# Patient Record
Sex: MALE | Race: WHITE | NOT HISPANIC OR LATINO | Employment: FULL TIME | URBAN - METROPOLITAN AREA
[De-identification: names, ages, dates, MRNs, and addresses within clinical notes are randomized per-mention and may not be internally consistent; named-entity substitution may affect disease eponyms.]

---

## 2024-09-10 ENCOUNTER — CLINICAL SUPPORT (OUTPATIENT)
Dept: AUDIOLOGY | Facility: CLINIC | Age: 64
End: 2024-09-10
Payer: COMMERCIAL

## 2024-09-10 ENCOUNTER — APPOINTMENT (OUTPATIENT)
Dept: AUDIOLOGY | Facility: CLINIC | Age: 64
End: 2024-09-10

## 2024-09-10 ENCOUNTER — APPOINTMENT (OUTPATIENT)
Dept: OTOLARYNGOLOGY | Facility: CLINIC | Age: 64
End: 2024-09-10

## 2024-09-10 VITALS — BODY MASS INDEX: 21.07 KG/M2 | HEIGHT: 68 IN | TEMPERATURE: 96.8 F | WEIGHT: 139 LBS

## 2024-09-10 DIAGNOSIS — R42 DIZZINESS: Primary | ICD-10-CM

## 2024-09-10 DIAGNOSIS — H90.3 SENSORINEURAL HEARING LOSS (SNHL) OF BOTH EARS: ICD-10-CM

## 2024-09-10 DIAGNOSIS — H61.23 BILATERAL IMPACTED CERUMEN: ICD-10-CM

## 2024-09-10 DIAGNOSIS — R42 VERTIGO: Primary | ICD-10-CM

## 2024-09-10 DIAGNOSIS — H90.3 BILATERAL SENSORINEURAL HEARING LOSS: ICD-10-CM

## 2024-09-10 PROCEDURE — 92550 TYMPANOMETRY & REFLEX THRESH: CPT | Performed by: AUDIOLOGIST

## 2024-09-10 PROCEDURE — 99203 OFFICE O/P NEW LOW 30 MIN: CPT | Performed by: OTOLARYNGOLOGY

## 2024-09-10 PROCEDURE — 69210 REMOVE IMPACTED EAR WAX UNI: CPT | Performed by: OTOLARYNGOLOGY

## 2024-09-10 PROCEDURE — 92557 COMPREHENSIVE HEARING TEST: CPT | Performed by: AUDIOLOGIST

## 2024-09-10 PROCEDURE — 3008F BODY MASS INDEX DOCD: CPT | Performed by: OTOLARYNGOLOGY

## 2024-09-10 PROCEDURE — 1036F TOBACCO NON-USER: CPT | Performed by: OTOLARYNGOLOGY

## 2024-09-10 NOTE — PROGRESS NOTES
"   EVALUATION OF BENIGN POSITIONAL PAROXYSMAL VERTIGO BPPV    HISTORY:  Patient reports in Spring of 2024 he had an \"episode\" of waking up with room spinning; nauseated and felt \"off\" for a few hours.   He reports he had another episode in August; afraid to move that day.  He reports he tried the Epley \"himself\"; dizziness.  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   He denies any ringing in ears; no significant hearing loss.   He reports he has had re-occurring cerumen impaction most of his life; small ear canals bilaterally     EVALUATION: Hallpike head right and left were both negative for nystagmus; no report of dizziness.        RECOMMENDATIONS:   Added on for audiogram.   Following up with Diego Edward MD.   Consider further evaluation.      Shanna Pool M.A., CCC/A       "

## 2024-09-10 NOTE — PROGRESS NOTES
"  AUDIOLOGY ADULT AUDIOMETRIC EVALUATION    Name:  Adrián Madison  :  1960  Age:  63 y.o.  Date of Evaluation:  September 10, 2024    Reason for visit: Adrián is seen in the clinic today for an audiologic evaluation.     HISTORY  Patient reports in Spring of 2024 he had an \"episode\" of waking up with room spinning; nauseated and felt \"off\" for a few hours.   He reports he had another episode in August; afraid to move that day.  He reports he tried the Epley \"himself\"; dizziness.  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   He denies any ringing in ears; no significant hearing loss.   He reports he has had re-occurring cerumen impaction most of his life; small ear canals bilaterally.   Today he was negative for BPPV bilaterally.      EVALUATION  See scanned audiogram: “Media” > “Audiology Report”.      RESULTS  Otoscopic Evaluation: *small; narrow ear canals bilaterally  Right Ear: moderately occluded  Left Ear: mildly occluded     Immittance Measures:  Tympanometry:  Right Ear: Type A, normal tympanic membrane mobility with normal middle ear pressure  Left Ear: Type A, normal tympanic membrane mobility with normal middle ear pressure    Acoustic Reflexes:  Ipsilateral Right Ear: Acoustic reflexes present within normal limits 500Hz through 4KHz   Ipsilateral Left Ear: Acoustic reflexes present within normal limits 500Hz through 4KHz   Contralateral Right Ear: did not evaluate  Contralateral Left Ear: did not evaluate    Distortion Product Otoacoustic Emissions (DPOAEs):  Right Ear: Passed 1KHz-6KHz; refer at 8KHz   Left Ear: Passed 1500Hz-5KHz; Refer at 1KHz; 6KHz-8KHz     Audiometry:  Test Technique and Reliability:   Standard audiometry via insert phones.   Reliability is good.    Pure tone air and bone conduction audiometry:  Right Ear: Hearing sensitivity within normal limits 250Hz through 6KHz; moderate hearing loss 8KHz   Left Ear: Hearing sensitivity " within normal limits 250Hz through 4KHz; mild to moderate sensorineural hearing loss 6KHz-8KHz     Speech Audiometry (Word Recognition Scores):   Right Ear: Excellent  Left Ear: Excellent    IMPRESSIONS    Normal hearing sensitivity through 6KHz right ear; moderate hearing loss at 8KHz   Left ear demonstrated normal hearing sensitivity through 4KHz; mild to moderate hearing loss 6KHz-8KHz     The presence of acoustic reflexes within normal intensity limits is consistent with normal middle ear and brainstem function, and suggests that auditory sensitivity is not significantly impaired. An elevated or absent acoustic reflex threshold is consistent with a middle ear disorder, hearing loss in the stimulated ear, and/or interruption of neural innervation of the stapedius muscle. Present DPOAEs suggest normal/near normal cochlear outer hair cell function and are consistent with no greater than a mild hearing loss at those frequencies. Absent DPOAEs are consistent with abnormal cochlear outer hair cell function and some degree of hearing loss at those frequencies.    RECOMMENDATIONS  - Follow up with otolaryngology today as scheduled; have cerumen removed; consider further evaluation of episodic dizziness/imbalance  - Annual audiologic evaluation, sooner if an acute change is noted.    PATIENT EDUCATION  Discussed results, impressions and recommendations with the patient. Questions were addressed and the patient was encouraged to contact our office should concerns arise.    Time for this encounter: 1030/1110      Shanna Pool M.A., CCC/A   Licensed Audiologist

## 2024-09-10 NOTE — PROGRESS NOTES
"Chief Complaint   Patient presents with    New Patient Visit     F/U AUDIO/DIZZINESS ON AND OFF     HPI:  Adrián Mdaison is a 63 y.o. male for complaints dizziness.  Has been occurring for about 6 months intermittent in nature.  Had 2 significant episodes in August which lasted several hours.  Mainly feels very off balance with some sensation of movement as well as some nausea.  No significant hearing trouble, tinnitus, pressure, pain.  Audiogram performed today does show high-frequency sensorineural hearing loss    PMH:  Past Medical History:   Diagnosis Date    Encounter for general adult medical examination without abnormal findings 02/25/2015    Physical exam, routine     History reviewed. No pertinent surgical history.      Medications:   No current outpatient medications on file.     Allergies:  Allergies   Allergen Reactions    Bee Venom Protein (Honey Bee) Anaphylaxis     Yellow jackets and bees per patient        ROS:  Review of systems normal unless stated otherwise in the HPI and/or PMH.    Physical Exam:  Temperature 36 °C (96.8 °F), height 1.727 m (5' 8\"), weight 63 kg (139 lb). Body mass index is 21.13 kg/m².     GENERAL APPEARANCE: Well developed and well nourished.  Alert and oriented in no acute distress.  Normal vocal quality.      HEAD/FACE: No erythema or edema or facial tenderness.  Normal facial nerve function bilaterally.    EAR:       EXTERNAL: Normal pinnas and bilateral obstructive cerumen impaction was removed by myself with instrumentation using the operating microscope.  Normal pinnas and external auditory canals after cleaning.       MIDDLE EAR: Tympanic membranes intact and mobile with normal landmarks.  Middle ear space appears well aerated.       TUBE STATUS: N/A       MASTOID CAVITY: N/A       HEARING: Gross hearing assessment is within normal limits.      NOSE:       VISUALIZED USING: Anterior rhinoscopy with headlight and nasal speculum.       DORSUM: Midline, nontraumatic " appearance.       MUCOSA: Normal-appearing.       SECRETIONS: Normal.       SEPTUM: Midline and nonobstructing.       INFERIOR TURBINATES: Normal.       MIDDLE TURBINATES/MEATUS: N/A       BLEEDING: N/A         ORAL CAVITY/PHARYNX:       TEETH: Adequate dentition.       TONGUE: No mass or lesion.  Normal mobility.       FLOOR OF MOUTH: No mass or lesion.       PALATE: Normal hard palate, soft palate, and uvula.       OROPHARYNX: Normal without mass or lesion.       BUCCAL MUCOSA/GBS: Normal without mass or lesion.       LIPS: Normal.    LARYNX/HYPOPHARYNX/NASOPHARYNX: N/A    NECK: No palpable masses or abnormal adenopathy.  Trachea is midline.    THYROID: No thyromegaly or palpable nodule.    SALIVARY GLANDS: Normal bilateral parotid and submandibular glands by inspection and palpation.    TMJ's: Normal.    NEURO: Cranial nerve exam grossly normal bilaterally.       Assessment/Plan   Adrián was seen today for new patient visit.  Diagnoses and all orders for this visit:  Vertigo (Primary)  -     MR IAC w and wo IV contrast; Future  -     Referral to Physical Therapy; Future  -     Vestibular Testing; Future  Bilateral impacted cerumen  Bilateral sensorineural hearing loss     Having some vertigo.  Does not sound positional in nature.  Could be peripheral or central.  Recommend we do some testing with MRI as well as VNG and begin vestibular rehabilitation for treatment.  Follow up for test results after testing is complete.     Diego Edward MD

## 2024-09-25 ENCOUNTER — HOSPITAL ENCOUNTER (OUTPATIENT)
Dept: RADIOLOGY | Facility: CLINIC | Age: 64
Discharge: HOME | End: 2024-09-25
Payer: COMMERCIAL

## 2024-09-25 DIAGNOSIS — R42 VERTIGO: ICD-10-CM

## 2024-09-25 PROCEDURE — 70553 MRI BRAIN STEM W/O & W/DYE: CPT | Performed by: RADIOLOGY

## 2024-09-25 PROCEDURE — 2550000001 HC RX 255 CONTRASTS: Performed by: OTOLARYNGOLOGY

## 2024-09-25 PROCEDURE — A9575 INJ GADOTERATE MEGLUMI 0.1ML: HCPCS | Performed by: OTOLARYNGOLOGY

## 2024-09-25 PROCEDURE — 70553 MRI BRAIN STEM W/O & W/DYE: CPT

## 2024-09-25 RX ORDER — GADOTERATE MEGLUMINE 376.9 MG/ML
13 INJECTION INTRAVENOUS
Status: COMPLETED | OUTPATIENT
Start: 2024-09-25 | End: 2024-09-25

## 2024-09-26 ENCOUNTER — TELEPHONE (OUTPATIENT)
Dept: OTOLARYNGOLOGY | Facility: CLINIC | Age: 64
End: 2024-09-26
Payer: COMMERCIAL

## 2024-09-26 DIAGNOSIS — R93.0 ABNORMAL MRI OF HEAD: ICD-10-CM

## 2024-09-26 DIAGNOSIS — D33.3 LEFT ACOUSTIC NEUROMA (MULTI): Primary | ICD-10-CM

## 2024-09-26 NOTE — TELEPHONE ENCOUNTER
Pt called and left a message.  He said he saw the results of his MRI on my chart.  He is worried .  He said it says he has several tumors.

## 2024-09-27 ENCOUNTER — TELEPHONE (OUTPATIENT)
Dept: OTOLARYNGOLOGY | Facility: CLINIC | Age: 64
End: 2024-09-27
Payer: COMMERCIAL

## 2024-09-27 NOTE — TELEPHONE ENCOUNTER
Contacted patient regarding acoustic neuroma referral from Dr. Edward. Patient has been scheduled with both ENT and Neurosurgery providers for Skull base coordination. He will contact me directly for additional questions regarding appointments.

## 2024-10-07 NOTE — PROGRESS NOTES
History Of Present Illness:  Adrián Madison is a 64 y.o. male whom presents to me as a new patient for left acoustic neuroma and right sided abnormal MRI, referred here today by Diego Edward MD. The patient reports dizziness which has occurred since March 2024. He initially had the Epley maneuver performed which he reports made his symptoms worse. He mainly feels very off balance with some sensation of movement as well as some nausea. He reports 2 significant episodes of vertigo in August which caused him to miss multiple days of work.     He reports his brother also had an acoustic neuroma and had surgery for this where he lost his hearing.     The patient reports ear problems going back to when he was 7. He endorses otorrhea once a year and had regular ear irrigations.      Past Medical History:  He has a past medical history of Encounter for general adult medical examination without abnormal findings (02/25/2015).    Surgical History:  He has no past surgical history on file.     Social History:  He reports that he has never smoked. He has never used smokeless tobacco. No history on file for alcohol use and drug use.    Family History:  No family history on file.     Medications:  Current Outpatient Medications   Medication Instructions    EPINEPHrine 0.3 mg/0.3 mL injection syringe INJECT 0.3 ML INTRAMUSCULARLY AS NEEDED.    omeprazole (PriLOSEC) 20 mg DR capsule 1 capsule, Every 24 hours    predniSONE (Deltasone) 10 mg tablet 2 tablets, oral, Daily    tiZANidine (Zanaflex) 4 mg tablet 1 tablet, oral, 3 times daily PRN        Allergies:  Bee venom protein (honey bee)    Review of Systems:   A comprehensive 10-point review of systems was obtained including constitutional, neurological, HEENT, pulmonary, cardiovascular, genito-urinary, and other pertinent systems and was negative except as noted in the HPI.      Physical Exam:  Constitutional   General appearance: Healthy-appearing, well-nourished, well  "groomed, in no acute distress.   Ability to communicate: Normal communication without aids, normal voice quality.       Head and face: Atraumatic with no masses, lesions, or scarring.   Facial strength: Normal strength and symmetry, no synkinesis or facial tic.     Ears  Otoscopic examination: Bilateral normal otoscopy of the tympanic membrane     Nose: Dorsum symmetric with no visible or palpable deformities.     Oral Cavity/Mouth  Lips, teeth, and gums: Normal lips, gums, and dentition.     Oropharynx: Mucosa moist, no lesions.     Neck: Symmetrical, trachea midline. No masses visible.     Neurological/Psychiatric  Cranial Nerve Examination: II - XII grossly intact.  Orientation to person, place, and time: Normal.  Mood and affect: Normal.     Skin: Normal without rashes or lesions.     Pulmonary  Respiratory effort: Chest expands symmetrically.     Cardiovascular: Good peripheral pulses  Peripheral vascular system: No varicosities, carotid pulse normal, no edema. No jugular venous distension.     Extremities: Appearance of extremities: Normal. Gait normal.     Last Recorded Vitals:  Temperature 37.1 °C (98.8 °F), height 1.676 m (5' 6\"), weight 63.3 kg (139 lb 9.6 oz).    Relevant Results:  I personally reviewed the MRI IAC from 9/25/24 which demonstrated enhancing mass centered in the left IAC measuring 8 mm in transverse dimension by 5 mm in Ap dimension by 5 mm in craniocaudal dimension. The fundus is spared with no significant protrusion into the cerebellopontine angle cistern.     I personally reviewed the audiogram from 9/10/24 which demonstrated normal hearing with moderate hearing loss at 8000 Hz on the right and mild to moderate SNHL 6968-2466 Hz.          Assessment/Plan   64 y.o. male whom presents to me as a new patient for left acoustic neuroma and right sided abnormal MRI, referred here today by Diego Edward MD. The patient reports dizziness which has occurred since March 2024. He initially had " the Epley maneuver performed which he reports made his symptoms worse. He mainly feels very off balance with some sensation of movement as well as some nausea. He reports his brother also had an acoustic neuroma and had surgery for this where he lost his hearing. I did discuss and offer genetic testing to assess for NF2 gene but he declined. He does not have children at this point. We discussed treatment options for vestibular schwannoma including surgery vs radiation vs observation. The patient has elected to proceed with observation at this time. I will order the patient a repeat MRI IAC to be completed in 6 months. The patient will follow-up with me in 6 months.       -Referral to vestibular PT  -Order repeat MRI IAC to be repeated in 6 months  -RTC in 6 months    Scribe Attestation  By signing my name below, IFabiana Scribe   attest that this documentation has been prepared under the direction and in the presence of Leyda Ruiz MD.     I have reviewed the documentation as scribed by Jamie Tamayo and agree with the notes.   Leyda Ruiz MD

## 2024-10-07 NOTE — PATIENT INSTRUCTIONS
Welcome to Dr. Ruiz's clinic. We are here to assist you through your ENT care at [facility]. Dr. Ruiz is an Ear surgeon. This means that she specializes in taking care of patients with complex ear problems.     Dr. Ruiz's office number is [phone]. While you may see her at a satellite office, she has a team committed to help meet your healthcare needs at [facility]'s main [place]. This number is the most direct way to communicate with the office.     Keli is Dr. Ruiz's  and she answers the office phone from 8am-4pm Mon-Fri. She can help you with many general questions and information. Questions that she cannot answer will be directed to the appropriate staff. You may need to leave a message. In this case, someone from the team will call you back.     Jamie Nayak RN, is Dr. Ruiz's primary nurse and can be reached by calling the office. Jamie is in clinic with Dr. Ruiz's on Mondays and Tuesdays. Non-urgent calls will be returned on non-clinic days typically Thursdays.     Sometimes, other team members will also be involved in your care. These people may include dieticians, social workers, speech therapists, audiologist, neurologist, and physical therapist. Dr. Ruiz will provide these referrals as needed. Please let her know if you would like to request a specific referral.     For your convenience, Dr. Ruiz sees patients at several [facility] locations including [facility] and [facility] at the main campus of [facility]. While we try to make your appointments as convenient as possible, occasionally a visit to another location may be necessary to provide the best care for you. We look forward to working with you to meet your healthcare goals.     Dr. Ruiz makes every effort to run on time for your appointments. Therefore, if you are more than 30 minutes late unrelated to a scan or another appointment such therapy or audio you will have to  reschedule.

## 2024-10-08 ENCOUNTER — APPOINTMENT (OUTPATIENT)
Dept: NEUROSURGERY | Facility: CLINIC | Age: 64
End: 2024-10-08
Payer: COMMERCIAL

## 2024-10-08 ENCOUNTER — APPOINTMENT (OUTPATIENT)
Dept: OTOLARYNGOLOGY | Facility: CLINIC | Age: 64
End: 2024-10-08
Payer: COMMERCIAL

## 2024-10-08 VITALS — HEIGHT: 66 IN | WEIGHT: 139.6 LBS | BODY MASS INDEX: 22.43 KG/M2 | TEMPERATURE: 98.8 F

## 2024-10-08 DIAGNOSIS — D33.3 LEFT ACOUSTIC NEUROMA (MULTI): ICD-10-CM

## 2024-10-08 DIAGNOSIS — R42 DIZZINESS AND GIDDINESS: Primary | ICD-10-CM

## 2024-10-08 PROBLEM — K21.9 GERD (GASTROESOPHAGEAL REFLUX DISEASE): Status: ACTIVE | Noted: 2024-10-08

## 2024-10-08 PROBLEM — J06.9 ACUTE UPPER RESPIRATORY INFECTION: Status: ACTIVE | Noted: 2024-10-08

## 2024-10-08 PROBLEM — J10.1 INFLUENZA A: Status: ACTIVE | Noted: 2024-10-08

## 2024-10-08 PROBLEM — S59.919A FOREARM INJURY: Status: ACTIVE | Noted: 2024-10-08

## 2024-10-08 PROBLEM — S59.909A ELBOW INJURY: Status: ACTIVE | Noted: 2024-10-08

## 2024-10-08 PROBLEM — G56.00 CARPAL TUNNEL SYNDROME: Status: ACTIVE | Noted: 2024-10-08

## 2024-10-08 PROBLEM — R07.81 RIB PAIN ON RIGHT SIDE: Status: ACTIVE | Noted: 2024-10-08

## 2024-10-08 PROBLEM — M62.838 MUSCLE SPASM: Status: ACTIVE | Noted: 2024-10-08

## 2024-10-08 PROBLEM — R05.9 COUGH: Status: ACTIVE | Noted: 2024-10-08

## 2024-10-08 PROBLEM — T14.8XXA MUSCLE STRAIN: Status: ACTIVE | Noted: 2024-10-08

## 2024-10-08 PROBLEM — M77.11 LATERAL EPICONDYLITIS OF RIGHT ELBOW: Status: ACTIVE | Noted: 2024-10-08

## 2024-10-08 PROBLEM — S40.021A ARM CONTUSION, RIGHT, INITIAL ENCOUNTER: Status: ACTIVE | Noted: 2024-10-08

## 2024-10-08 PROBLEM — R51.9 HEADACHE: Status: ACTIVE | Noted: 2024-10-08

## 2024-10-08 PROBLEM — E55.9 VITAMIN D DEFICIENCY: Status: ACTIVE | Noted: 2024-10-08

## 2024-10-08 PROBLEM — R20.0 ANESTHESIA OF SKIN: Status: ACTIVE | Noted: 2024-10-08

## 2024-10-08 PROBLEM — R20.2 PARESTHESIA OF SKIN: Status: ACTIVE | Noted: 2024-10-08

## 2024-10-08 PROBLEM — H61.23 IMPACTED CERUMEN OF BOTH EARS: Status: ACTIVE | Noted: 2024-10-08

## 2024-10-08 PROBLEM — K42.9 UMBILICAL HERNIA: Status: ACTIVE | Noted: 2024-10-08

## 2024-10-08 PROBLEM — K40.20 BILATERAL INGUINAL HERNIA: Status: ACTIVE | Noted: 2024-10-08

## 2024-10-08 PROCEDURE — 99205 OFFICE O/P NEW HI 60 MIN: CPT | Performed by: OTOLARYNGOLOGY

## 2024-10-08 PROCEDURE — 3008F BODY MASS INDEX DOCD: CPT | Performed by: OTOLARYNGOLOGY

## 2024-10-08 RX ORDER — TIZANIDINE 4 MG/1
1 TABLET ORAL 3 TIMES DAILY PRN
COMMUNITY
Start: 2018-01-17

## 2024-10-08 RX ORDER — OMEPRAZOLE 20 MG/1
1 CAPSULE, DELAYED RELEASE ORAL EVERY 24 HOURS
COMMUNITY

## 2024-10-08 RX ORDER — PREDNISONE 10 MG/1
2 TABLET ORAL DAILY
COMMUNITY
Start: 2018-01-17

## 2024-10-08 RX ORDER — EPINEPHRINE 0.3 MG/.3ML
INJECTION SUBCUTANEOUS
COMMUNITY

## 2024-10-08 ASSESSMENT — PATIENT HEALTH QUESTIONNAIRE - PHQ9
1. LITTLE INTEREST OR PLEASURE IN DOING THINGS: NOT AT ALL
SUM OF ALL RESPONSES TO PHQ9 QUESTIONS 1 AND 2: 0
2. FEELING DOWN, DEPRESSED OR HOPELESS: NOT AT ALL

## 2024-10-08 ASSESSMENT — PAIN SCALES - GENERAL: PAINLEVEL: 0-NO PAIN

## 2024-10-08 NOTE — LETTER
October 9, 2024     Diego Edward MD  [address]  [address]  [facility] [address]    Patient: Adrián Madison   YOB: 1960   Date of Visit: 10/8/2024       Dear Dr. Diego Edward MD:    Thank you for referring Adrián Madison to me for evaluation. Below are my notes for this consultation.  If you have questions, please do not hesitate to call me. I look forward to following your patient along with you.       Sincerely,     Leyda Ruiz MD      CC: Elie Soler MD  ______________________________________________________________________________________    History Of Present Illness:  Adrián Madison is a 64 y.o. male whom presents to me as a new patient for left acoustic neuroma and right sided abnormal MRI, referred here today by Diego Edward MD. The patient reports dizziness which has occurred since March 2024. He initially had the Epley maneuver performed which he reports made his symptoms worse. He mainly feels very off balance with some sensation of movement as well as some nausea. He reports 2 significant episodes of vertigo in August which caused him to miss multiple days of work.     He reports his brother also had an acoustic neuroma and had surgery for this where he lost his hearing.     The patient reports ear problems going back to when he was 7. He endorses otorrhea once a year and had regular ear irrigations.      Past Medical History:  He has a past medical history of Encounter for general adult medical examination without abnormal findings (02/25/2015).    Surgical History:  He has no past surgical history on file.     Social History:  He reports that he has never smoked. He has never used smokeless tobacco. No history on file for alcohol use and drug use.    Family History:  No family history on file.     Medications:  Current Outpatient Medications   Medication Instructions   • EPINEPHrine 0.3 mg/0.3 mL injection syringe INJECT 0.3 ML INTRAMUSCULARLY AS  "NEEDED.   • omeprazole (PriLOSEC) 20 mg DR capsule 1 capsule, Every 24 hours   • predniSONE (Deltasone) 10 mg tablet 2 tablets, oral, Daily   • tiZANidine (Zanaflex) 4 mg tablet 1 tablet, oral, 3 times daily PRN        Allergies:  Bee venom protein (honey bee)    Review of Systems:   A comprehensive 10-point review of systems was obtained including constitutional, neurological, HEENT, pulmonary, cardiovascular, genito-urinary, and other pertinent systems and was negative except as noted in the HPI.      Physical Exam:  Constitutional   General appearance: Healthy-appearing, well-nourished, well groomed, in no acute distress.   Ability to communicate: Normal communication without aids, normal voice quality.       Head and face: Atraumatic with no masses, lesions, or scarring.   Facial strength: Normal strength and symmetry, no synkinesis or facial tic.     Ears  Otoscopic examination: Bilateral normal otoscopy of the tympanic membrane     Nose: Dorsum symmetric with no visible or palpable deformities.     Oral Cavity/Mouth  Lips, teeth, and gums: Normal lips, gums, and dentition.     Oropharynx: Mucosa moist, no lesions.     Neck: Symmetrical, trachea midline. No masses visible.     Neurological/Psychiatric  Cranial Nerve Examination: II - XII grossly intact.  Orientation to person, place, and time: Normal.  Mood and affect: Normal.     Skin: Normal without rashes or lesions.     Pulmonary  Respiratory effort: Chest expands symmetrically.     Cardiovascular: Good peripheral pulses  Peripheral vascular system: No varicosities, carotid pulse normal, no edema. No jugular venous distension.     Extremities: Appearance of extremities: Normal. Gait normal.     Last Recorded Vitals:  Temperature 37.1 °C (98.8 °F), height 1.676 m (5' 6\"), weight 63.3 kg (139 lb 9.6 oz).    Relevant Results:  I personally reviewed the MRI IAC from 9/25/24 which demonstrated enhancing mass centered in the left IAC measuring 8 mm in transverse " dimension by 5 mm in Ap dimension by 5 mm in craniocaudal dimension. The fundus is spared with no significant protrusion into the cerebellopontine angle cistern.     I personally reviewed the audiogram from 9/10/24 which demonstrated normal hearing with moderate hearing loss at 8000 Hz on the right and mild to moderate SNHL 6053-3139 Hz.          Assessment/Plan  64 y.o. male whom presents to me as a new patient for left acoustic neuroma and right sided abnormal MRI, referred here today by Diego Edward MD. The patient reports dizziness which has occurred since March 2024. He initially had the Epley maneuver performed which he reports made his symptoms worse. He mainly feels very off balance with some sensation of movement as well as some nausea. He reports his brother also had an acoustic neuroma and had surgery for this where he lost his hearing. I did discuss and offer genetic testing to assess for NF2 gene but he declined. He does not have children at this point. We discussed treatment options for vestibular schwannoma including surgery vs radiation vs observation. The patient has elected to proceed with observation at this time. I will order the patient a repeat MRI IAC to be completed in 6 months. The patient will follow-up with me in 6 months.       -Referral to vestibular PT  -Order repeat MRI IAC to be repeated in 6 months  -RTC in 6 months    Scribe Attestation  By signing my name below, IFabiana Scribe   attest that this documentation has been prepared under the direction and in the presence of Leyda Ruiz MD.     I have reviewed the documentation as scribed by Jamie Tamayo and agree with the notes.   Leyda Ruiz MD

## 2024-10-09 PROBLEM — R42 DIZZINESS AND GIDDINESS: Status: ACTIVE | Noted: 2024-10-09

## 2024-10-10 ENCOUNTER — APPOINTMENT (OUTPATIENT)
Dept: AUDIOLOGY | Facility: CLINIC | Age: 64
End: 2024-10-10
Payer: COMMERCIAL

## 2024-10-10 ENCOUNTER — APPOINTMENT (OUTPATIENT)
Dept: NEUROSURGERY | Facility: CLINIC | Age: 64
End: 2024-10-10
Payer: COMMERCIAL

## 2024-10-17 ENCOUNTER — APPOINTMENT (OUTPATIENT)
Dept: OTOLARYNGOLOGY | Facility: CLINIC | Age: 64
End: 2024-10-17
Payer: COMMERCIAL

## 2024-10-29 ENCOUNTER — APPOINTMENT (OUTPATIENT)
Dept: PHYSICAL THERAPY | Facility: CLINIC | Age: 64
End: 2024-10-29
Payer: COMMERCIAL

## 2024-11-07 NOTE — PROGRESS NOTES
"Physical Therapy Evaluation and Treatment     Patient Name: Adrián Madison  MRN: [id number]  Encounter date: 11/8/2024  Time Calculation  Start Time: 0745  Stop Time: 0840  Time Calculation (min): 55 min  PT Evaluation Time Entry  PT Evaluation (Low) Time Entry: 25  PT Therapeutic Procedures Time Entry  Neuromuscular Re-Education Time Entry: 25    Visit # 1 of 4  Visits/Dates Authorized: 11/7/24:  [facility] - NO AUTH / $6000 DEDUCT not met / $7500 OOP not met / 80% coins / 20V MAX - 0 used    Current Problem:   Problem List Items Addressed This Visit             ICD-10-CM    Left acoustic neuroma (Multi) - Primary D33.3    Relevant Orders    Follow Up In Physical Therapy     Precautions:          Subjective Evaluation    History of Present Illness  Mechanism of injury: Pt with intermittent dizziness that began in March, waking up with room spinning; nauseated and felt \"off\" for a few hours. Also 2 significant episodes of vertigo in August, mainly feels very off balance with some sensation of movement as well as some nausea, which caused him to miss multiple days of work.   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 1 day a week in facility for [employer]. Active with dogs, at times may have an off feeling in yard with looking down frequently to  after dogs.    VNG cx after CT head showed schwannoma:  CT head 9/26/24 IMPRESSION:  1.   Enhancing intracanalicular mass centered in the left IAC is  nonspecific but favored to represent a schwannoma.  2.  Cluster of signal abnormalities centered in the right temporal  parieto-occipital juxtacortical white matter without associated mass  effect or enhancement. The imaging characteristics raise the  possibility of multinodular and vacuolating tumor. Follow-up is  recommended to assess for stability.  3. Mild white-matter changes are nonspecific but may represent  small-vessel ischemic disease in a patient of this " "age.    Pt states he anticipates monitoring at this time, no plan for surgery or XRT. Additionally has follow up for R white matter abnormality.    Presents today for PT eval to learn how to stop the vertigo.    Pt denies issues standing still EC in shower, taking shirt overhead, etc.    10/8/24 otology:  Adrián Madison is a 64 y.o. male whom presents to me as a new patient for left acoustic neuroma and right sided abnormal MRI, referred here today by Diego Edward MD. The patient reports dizziness which has occurred since March 2024. He initially had the Epley maneuver performed which he reports made his symptoms worse. He mainly feels very off balance with some sensation of movement as well as some nausea. He reports 2 significant episodes of vertigo in August which caused him to miss multiple days of work.      He reports his brother also had an acoustic neuroma and had surgery for this where he lost his hearing.      The patient reports ear problems going back to when he was 7. He endorses otorrhea once a year and had regular ear irrigations.     9/10/24 audiology:  Patient reports in Spring of 2024 he had an \"episode\" of waking up with room spinning; nauseated and felt \"off\" for a few hours.   He reports he had another episode in August; afraid to move that day.  He reports he tried the Epley \"himself\"; dizziness.  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   He denies any ringing in ears; no significant hearing loss.   He reports he has had re-occurring cerumen impaction most of his life; small ear canals bilaterally.   Today he was negative for BPPV bilaterally.        Pain  Location: pain is not a primary complaint, does have issues with hands and L elbow from gripping     Treatments  No previous or current treatments       Pain Assessment: 0-10    Objective     Active Range of Motion   Cervical/Thoracic Spine   Normal active range of motion "     Dynamic Gait Index (DGI): 22/24 (able to provoke some symptoms with gait with head turns and gait with body turns)  Dynamic Gait Index (DGI), a score of 19 or less indicates increased fall risk    Gait on level surface 3  Change in gait speed 3  Gait with horizontal head turns 2  Gait with vertical head turns 3  Gait and pivot turn 2  Step over obstacle 3  Step around obstacle 3  Stairs 3    Gait with diagonals: some symptoms low left to high right  Gait with diagonals: less symptoms low right to high left  Gait with 360 CW/CCW some symptoms    Positional Testing  Positional Testing Comment: deferred Jan-Hallpike, (-) at audiology visit 9/10/24    Romberg: Firm Eyes Open 30+ sec, Eyes Closed 30+ sec , Foam Eyes Open 30+ sec, Eyes Closed 30+ sec , only challenged with addition of head movement foam EC    [place] Stepping Test: gradual 90 degree turn R in a large arc, unaware      Treatments:   Neuromuscular Re-Ed 90189:   Educated pt re proprioception and integration of visual, vestibular, somatosensory inputs. Educated pt re influence of L IAC mass yielding faulty input of motion/positioning. Educated pt re compensatory strategies including visual target and walking stick/touch point  Instructed and performed exercises for habituation:  Gait with 360 turns CW/CCW  Gait with head turns, nods, diagonals  DLS EC foam head turns, nods, diagonals      HEP / Access Codes:   11/8/24 [id number]    Assessment & Plan     Assessment  Impairments: abnormal gait, activity intolerance, impaired balance and lacks appropriate home exercise program  Assessment details:   Pt is a 65 y/o active male with intermittent vertigo/dizziness and nausea impacting function and has yielded missing work. Pt needs skilled PT to learn habitation and compensatory strategies. Expected to be proficient with HEP within a few visits.    Low complexity due to patient's clinical presentation being stable and uncomplicated by any significant  comorbidities that may affect rehab tolerance and progression.  Prognosis: good    Plan  Therapy options: will be seen for skilled physical therapy services  Planned therapy interventions: abdominal trunk stabilization, postural training, neuromuscular re-education, therapeutic activities, strengthening, stretching, home exercise program, gait training, functional ROM exercises, flexibility, balance/weight-bearing training and body mechanics training  Treatment plan discussed with: patient       Post-Treatment Symptoms:  Response to Interventions: able to provoke some symptoms with gait with head turns and gait with body turns    Goals:

## 2024-11-08 ENCOUNTER — CLINICAL SUPPORT (OUTPATIENT)
Dept: PHYSICAL THERAPY | Facility: CLINIC | Age: 64
End: 2024-11-08
Payer: COMMERCIAL

## 2024-11-08 DIAGNOSIS — D33.3 LEFT ACOUSTIC NEUROMA (MULTI): Primary | ICD-10-CM

## 2024-11-08 PROCEDURE — 97161 PT EVAL LOW COMPLEX 20 MIN: CPT | Mod: GP

## 2024-11-08 PROCEDURE — 97112 NEUROMUSCULAR REEDUCATION: CPT | Mod: GP

## 2024-11-08 ASSESSMENT — PAIN - FUNCTIONAL ASSESSMENT: PAIN_FUNCTIONAL_ASSESSMENT: 0-10

## 2024-11-10 ASSESSMENT — ACTIVITIES OF DAILY LIVING (ADL): POOR_BALANCE: 1

## 2024-12-11 ENCOUNTER — TREATMENT (OUTPATIENT)
Dept: PHYSICAL THERAPY | Facility: CLINIC | Age: 64
End: 2024-12-11
Payer: COMMERCIAL

## 2024-12-11 DIAGNOSIS — D33.3 LEFT ACOUSTIC NEUROMA (MULTI): ICD-10-CM

## 2024-12-11 PROCEDURE — 97112 NEUROMUSCULAR REEDUCATION: CPT | Mod: GP

## 2024-12-11 NOTE — PROGRESS NOTES
Physical Therapy Treatment    Patient Name: Adrián Madison  MRN: [id number]  Encounter date: 12/11/2024    Time Calculation  Start Time: 0755  Stop Time: 0835  Time Calculation (min): 40 min  PT Therapeutic Procedures Time Entry  Neuromuscular Re-Education Time Entry: 40    Visit # 2 of 4  Visits/Dates Authorized: [facility] - NO AUTH / $6000 DEDUCT not met / $7500 OOP not met / 80% coins / 20V MAX - 0 used    Current Problem:   Problem List Items Addressed This Visit             ICD-10-CM    Left acoustic neuroma (Multi) D33.3       Precautions:    dizziness       Subjective   General:   General Comment: Had done better until Mon morning, felt like it would be a bad day upon waking. Tried to go to work, furniture needed to be moved at a residence and nausea required him to go home early. Pt endorses low sodium diet but did have pot roast/gravy prior day.    Pre-Treatment Symptoms:   Pain Assessment: 0-10  0-10 (Numeric) Pain Score:  (pain not a primary complaint but does have pain in hands from constant gripping carpet cleaning tools and lifiting rugs)    Objective   Findings:    Postural sway compliant surfaces EC       Treatments:    Neuromuscular Re-Ed 48017:   Gait with head turns, nods  Gait with diagonals: some symptoms low left to high right  Gait with diagonals: less symptoms low right to high left  Gait with 360 CW/CCW some symptoms  DLS EC foam head turns, nods, diagonals  DLS EC foam weight shift  RB AP  RB ML  Educated pt re potential for increased sodium to cause fluid retention and exacerbate symptoms. Pt reports usually low sodium/no added sodium diet but does believe pot roast and gravy prior day has elevated sodium vs baseline foods.        HEP / Access Codes:   11/8/24 [id number]      Assessment:   PT Assessment  Assessment Comment: Pt may have had symptom exac related to increased sodium intake prior evening. Will benefit from symptom tracking to attempt to determine triggers.    Post-Treatment Symptoms:    Response to Interventions: Decrease in pain    Plan:    Advance to ability      Goals:   Active       PT Problem       Patient Stated Goal       Start:  11/08/24    Expected End:  03/06/25       Improve dizziness.  Be able to complete tasks and not miss work.         LTG       Start:  12/12/24    Expected End:  03/06/25       Pt will be able to complete work shifts without symptom exacerbation.

## 2024-12-12 ASSESSMENT — PAIN - FUNCTIONAL ASSESSMENT: PAIN_FUNCTIONAL_ASSESSMENT: 0-10

## 2024-12-17 ENCOUNTER — APPOINTMENT (OUTPATIENT)
Dept: NEUROSURGERY | Facility: CLINIC | Age: 64
End: 2024-12-17
Payer: COMMERCIAL

## 2025-02-05 ENCOUNTER — OFFICE VISIT (OUTPATIENT)
Dept: RHEUMATOLOGY | Facility: CLINIC | Age: 65
End: 2025-02-05
Payer: COMMERCIAL

## 2025-02-05 VITALS — BODY MASS INDEX: 24.19 KG/M2 | DIASTOLIC BLOOD PRESSURE: 86 MMHG | WEIGHT: 149.9 LBS | SYSTOLIC BLOOD PRESSURE: 164 MMHG

## 2025-02-05 DIAGNOSIS — M19.90 OSTEOARTHRITIS, UNSPECIFIED OSTEOARTHRITIS TYPE, UNSPECIFIED SITE: Primary | ICD-10-CM

## 2025-02-05 DIAGNOSIS — M77.12 LATERAL EPICONDYLITIS OF LEFT ELBOW: ICD-10-CM

## 2025-02-05 PROCEDURE — 99214 OFFICE O/P EST MOD 30 MIN: CPT | Performed by: INTERNAL MEDICINE

## 2025-02-05 PROCEDURE — 99204 OFFICE O/P NEW MOD 45 MIN: CPT | Performed by: INTERNAL MEDICINE

## 2025-02-05 RX ORDER — NABUMETONE 750 MG/1
750 TABLET, FILM COATED ORAL 2 TIMES DAILY
Qty: 60 TABLET | Refills: 3 | Status: SHIPPED | OUTPATIENT
Start: 2025-02-05 | End: 2025-10-03

## 2025-02-05 ASSESSMENT — RHEUMATOLOGY NEW PATIENT QUESTIONNAIRE
RASH: N
NIGHT SWEATS: N
SKIN TIGHTNESS: N
SEIZURES: N
EYE PAIN: N
MUSCLE WEAKNESS: N
COLOR CHANGES OF HANDS OR FEET IN THE COLD: N
UNUSUAL BLEEDING: N
INCREASED SUSCEPTIBILITY TO INFECTION: N
FAINTING: N
HOARSE VOICE: N
UNUSUAL FATIGUE: N
BLACK STOOLS: N
EASY BRUISING: N
JOINT PAIN: Y
HEADACHES: N
MORNING STIFFNESS: Y
EYE DRYNESS: N
BLOOD IN STOOLS: N
DIFFICULTY BREATHING LYING DOWN: N
JAUNDICE: N
FEVER: N
EASILY LOSING TEMPER: N
EYE REDNESS: N
SUN SENSITIVE (SUN ALLERGY): N
ANXIETY: N
SKIN REDNESS: N
SHORTNESS OF BREATH: N
VOMITING OF BLOOD OR COFFEE GROUND CONSISTENCY MATERIAL: N
LOSS OF VISION: N
DOUBLE OR BLURRED VISION: N
PERSISTENT DIARRHEA: N
DEPRESSION: N
EXCESSIVE HAIR LOSS (MORE THAN YOUR NORM): N
UNEXPLAINED HEARING LOSS: N
SWOLLEN LEGS OR FEET: N
DIFFICULTY FALLING ASLEEP: N
AGITATION: N
COUGH: N
STOMACH PAIN: N
NAUSEA: N
ANEMIA: N
JOINT SWELLING: Y
HOW WOULD YOU DESCRIBE YOUR STIFFNESS ON AVERAGE: MILD
SORES IN MOUTH OR NOSE: N
NODULES/BUMPS: N
CHEST PAIN: N
DRYNESS OF MOUTH: N
HEARTBURN OR REFLUX: Y
UNEXPLAINED WEIGHT CHANGE: N
DIFFICULTY STAYING ASLEEP: Y
MORNING STIFFNESS IN LOWER BACK: Y
RASH OR ULCERS: N
BEHAVIORAL CHANGES: N
MEMORY LOSS: N
UNUSUALLY RAPID OR SLOWED HEART RATE: N
NUMBNESS OR TINGLING IN HANDS OR FEET: Y
ABNORMAL URINE: N
LOSS OF CONSCIOUSNESS: N
DIFFICULTY SWALLOWING: N
PAIN OR BURNING ON URINATION: N
SWOLLEN OR TENDER GLANDS: N

## 2025-02-05 NOTE — PROGRESS NOTES
"NP ref per Leyda Joseph for evaluation and treatment of Joint pain.  C/O left elbow x 2 months.  Repetitive motion with work every day.    CTS / trigger finger right hand.  Cortisone injection x year with little to no relief.    Aleve / Elodia aspirin with no relief.    HPI - \"constant pain in my hand\" \"way over a year\" and L elbow pain x2-3 mo.  He's a  but doesn't use his L arm for this.  He has to  heavy oriental rugs.  He tries ice and heat.  He took aleve, but he hasn't been taking it much because he's a beer drinker.  He'll take ASA 2-3x/wk.  He's tried biofreeze, voltaren gel, and shen day, which helps some.  He had OT for his hand that only helped only temporarily.  He had a trigger finger injection >1 yr ago, but he doesn't think it helped much.   Occ knee pain with steps.  No other pain.  No swelling.  AM stiffness, variable with weather and what he did the day before - can have stiffness and pain all day long.  ?Hand numbness R and tingling B - he feels something and then he drops his coffee cup because it hurts so much.    No fever, HA, sicca, mouth sores, raynauds, rash, CP, resp, or GI other than GERD.      He has taken naproxen, and per chart he had meloxicam, but he doesn't remember it.    FH - ?arthritis.  +gout.  No CTD  SH - former smoker.  EtOH \"more than I should\"  +marijuana - PO more than smoking.  No drugs    PE  Gen - NAD  Neck - supple, no LAD  CV - RRR no r/m/g  Lungs - CTA  Abd- +BS  Extr - 2+ DP, PT, and rad pulses.  No edema  Psych - nl affect  Neuro - nl strength.  Reflexes 2+ symmetric  Msk - no synovitis.  +heberdons.  Tender along R 3rd finger flexor tendon but no triggering elicited.  L lat epicondyle tender    A/P- OA, trigger finger, and tennis elbow  Nabumetone - Expl risks/benefits/no OTC NSAIDs - check BL labs  Tennis elbow strap  Consider neuro if tingling continues  Declines PT  Follow up PRN  "

## 2025-02-07 LAB
ALBUMIN SERPL-MCNC: 4.5 G/DL (ref 3.6–5.1)
ALP SERPL-CCNC: 55 U/L (ref 35–144)
ALT SERPL-CCNC: 18 U/L (ref 9–46)
ANION GAP SERPL CALCULATED.4IONS-SCNC: 6 MMOL/L (CALC) (ref 7–17)
AST SERPL-CCNC: 19 U/L (ref 10–35)
BASOPHILS # BLD AUTO: 29 CELLS/UL (ref 0–200)
BASOPHILS NFR BLD AUTO: 0.5 %
BILIRUB SERPL-MCNC: 0.8 MG/DL (ref 0.2–1.2)
BUN SERPL-MCNC: 18 MG/DL (ref 7–25)
CALCIUM SERPL-MCNC: 9.7 MG/DL (ref 8.6–10.3)
CHLORIDE SERPL-SCNC: 102 MMOL/L (ref 98–110)
CO2 SERPL-SCNC: 30 MMOL/L (ref 20–32)
CREAT SERPL-MCNC: 0.58 MG/DL (ref 0.7–1.35)
EGFRCR SERPLBLD CKD-EPI 2021: 109 ML/MIN/1.73M2
EOSINOPHIL # BLD AUTO: 52 CELLS/UL (ref 15–500)
EOSINOPHIL NFR BLD AUTO: 0.9 %
ERYTHROCYTE [DISTWIDTH] IN BLOOD BY AUTOMATED COUNT: 13 % (ref 11–15)
GLUCOSE SERPL-MCNC: 88 MG/DL (ref 65–99)
HCT VFR BLD AUTO: 47.7 % (ref 38.5–50)
HGB BLD-MCNC: 15.6 G/DL (ref 13.2–17.1)
LYMPHOCYTES # BLD AUTO: 2134 CELLS/UL (ref 850–3900)
LYMPHOCYTES NFR BLD AUTO: 36.8 %
MCH RBC QN AUTO: 28.6 PG (ref 27–33)
MCHC RBC AUTO-ENTMCNC: 32.7 G/DL (ref 32–36)
MCV RBC AUTO: 87.5 FL (ref 80–100)
MONOCYTES # BLD AUTO: 580 CELLS/UL (ref 200–950)
MONOCYTES NFR BLD AUTO: 10 %
NEUTROPHILS # BLD AUTO: 3004 CELLS/UL (ref 1500–7800)
NEUTROPHILS NFR BLD AUTO: 51.8 %
PLATELET # BLD AUTO: 213 THOUSAND/UL (ref 140–400)
PMV BLD REES-ECKER: 10.6 FL (ref 7.5–12.5)
POTASSIUM SERPL-SCNC: 4.3 MMOL/L (ref 3.5–5.3)
PROT SERPL-MCNC: 7.2 G/DL (ref 6.1–8.1)
RBC # BLD AUTO: 5.45 MILLION/UL (ref 4.2–5.8)
SODIUM SERPL-SCNC: 138 MMOL/L (ref 135–146)
WBC # BLD AUTO: 5.8 THOUSAND/UL (ref 3.8–10.8)

## 2025-02-14 ENCOUNTER — APPOINTMENT (OUTPATIENT)
Dept: PRIMARY CARE | Facility: CLINIC | Age: 65
End: 2025-02-14
Payer: COMMERCIAL

## 2025-02-14 VITALS
WEIGHT: 151.8 LBS | SYSTOLIC BLOOD PRESSURE: 164 MMHG | HEIGHT: 66 IN | BODY MASS INDEX: 24.4 KG/M2 | DIASTOLIC BLOOD PRESSURE: 92 MMHG

## 2025-02-14 DIAGNOSIS — I10 PRIMARY HYPERTENSION: ICD-10-CM

## 2025-02-14 DIAGNOSIS — Z12.11 ENCOUNTER FOR SCREENING COLONOSCOPY: ICD-10-CM

## 2025-02-14 DIAGNOSIS — Z13.220 LIPID SCREENING: ICD-10-CM

## 2025-02-14 DIAGNOSIS — Z12.5 ENCOUNTER FOR PROSTATE CANCER SCREENING: ICD-10-CM

## 2025-02-14 DIAGNOSIS — K21.9 GASTROESOPHAGEAL REFLUX DISEASE, UNSPECIFIED WHETHER ESOPHAGITIS PRESENT: ICD-10-CM

## 2025-02-14 DIAGNOSIS — Z00.00 HEALTHCARE MAINTENANCE: ICD-10-CM

## 2025-02-14 DIAGNOSIS — I51.7 ENLARGED HEART: ICD-10-CM

## 2025-02-14 PROCEDURE — 93000 ELECTROCARDIOGRAM COMPLETE: CPT | Performed by: INTERNAL MEDICINE

## 2025-02-14 RX ORDER — OMEPRAZOLE 40 MG/1
40 CAPSULE, DELAYED RELEASE ORAL
Qty: 90 CAPSULE | Refills: 0 | Status: SHIPPED | OUTPATIENT
Start: 2025-02-14

## 2025-02-14 RX ORDER — LOSARTAN POTASSIUM 50 MG/1
50 TABLET ORAL DAILY
Qty: 30 TABLET | Refills: 0 | Status: SHIPPED | OUTPATIENT
Start: 2025-02-14 | End: 2026-02-14

## 2025-02-14 RX ORDER — METOPROLOL SUCCINATE 25 MG/1
25 TABLET, EXTENDED RELEASE ORAL DAILY
Qty: 30 TABLET | Refills: 0 | Status: SHIPPED | OUTPATIENT
Start: 2025-02-14 | End: 2025-08-13

## 2025-02-14 ASSESSMENT — ENCOUNTER SYMPTOMS
DEPRESSION: 0
OCCASIONAL FEELINGS OF UNSTEADINESS: 0
LOSS OF SENSATION IN FEET: 0

## 2025-02-15 NOTE — PROGRESS NOTES
"Subjective   Patient ID: Adrián Madison is a 64 y.o. male who presents for Follow-up.    Adrián is a 64-year-old white gentleman came to my office because his blood pressure is very high, he is concerned.  He is concerned with his cholesterol.    I have personally reviewed the patient's Past Medical History, Medications, Allergies, Social History, and Family History in the EMR.    OPERATION:  He had an operation done on right finger.    IMMUNIZATION:  Tetanus within the last 10 years.    Review of Systems   All other systems reviewed and are negative.    Objective   BP (!) 164/92   Ht 1.676 m (5' 6\")   Wt 68.9 kg (151 lb 12.8 oz)   BMI 24.50 kg/m²     Physical Exam  Vitals reviewed.   HENT:      Right Ear: Tympanic membrane, ear canal and external ear normal.      Left Ear: Tympanic membrane, ear canal and external ear normal.   Eyes:      General: No scleral icterus.     Pupils: Pupils are equal, round, and reactive to light.   Neck:      Vascular: No carotid bruit.   Cardiovascular:      Heart sounds: Normal heart sounds, S1 normal and S2 normal. No murmur heard.     No friction rub.   Pulmonary:      Effort: Pulmonary effort is normal.      Breath sounds: Normal breath sounds and air entry.   Abdominal:      Palpations: There is no hepatomegaly, splenomegaly or mass.   Musculoskeletal:         General: No swelling or deformity. Normal range of motion.      Cervical back: Neck supple.      Right lower leg: No edema.      Left lower leg: No edema.   Lymphadenopathy:      Cervical: No cervical adenopathy.      Upper Body:      Right upper body: No axillary adenopathy.      Left upper body: No axillary adenopathy.      Lower Body: No right inguinal adenopathy. No left inguinal adenopathy.   Neurological:      Mental Status: He is oriented to person, place, and time.      Cranial Nerves: Cranial nerves 2-12 are intact. No cranial nerve deficit.      Sensory: No sensory deficit.      Motor: Motor function is " intact. No weakness.      Gait: Gait is intact.      Deep Tendon Reflexes: Reflexes normal.   Psychiatric:         Mood and Affect: Mood normal. Mood is not anxious or depressed. Affect is not angry.         Behavior: Behavior is not agitated.         Thought Content: Thought content normal.         Judgment: Judgment normal.     LAB WORK:  Laboratory testing ordered.    Assessment/Plan   Problem List Items Addressed This Visit             ICD-10-CM       Gastrointestinal and Abdominal    GERD (gastroesophageal reflux disease) K21.9    Relevant Medications    omeprazole (PriLOSEC) 40 mg DR capsule     Other Visit Diagnoses         Codes    Lipid screening     Z13.220    Relevant Orders    Comprehensive Metabolic Panel    Lipid Panel    Encounter for prostate cancer screening     Z12.5    Relevant Orders    Prostate Specific Antigen, Screen    Primary hypertension     I10    Relevant Medications    metoprolol succinate XL (Toprol-XL) 25 mg 24 hr tablet    losartan (Cozaar) 50 mg tablet    Other Relevant Orders    CBC    ECG 12 lead (Clinic Performed)    Referral to Cardiology    Healthcare maintenance     Z00.00    Relevant Orders    CBC    Thyroid Stimulating Hormone    Urinalysis with Reflex Microscopic    CT cardiac scoring wo IV contrast    Encounter for screening colonoscopy     Z12.11    Relevant Orders    Colonoscopy Screening; Average Risk Patient    Enlarged heart     I51.7    Relevant Medications    metoprolol succinate XL (Toprol-XL) 25 mg 24 hr tablet    Other Relevant Orders    Transthoracic Echo Complete    Referral to Cardiology        1. Hypertension.  EKG done.  Start medication.  2. GERD, on PPI.  3. Health maintenance.  Needs colonoscopy.  4. Complete blood work ordered.  5. Follow up in a couple of weeks for a physical.  6. Tetanus shot.  We will monitor.    Scribe Attestation  By signing my name below, Marine CORDOVA, Bird attest that this documentation has been prepared under the direction and  in the presence of Phoenix Guerrero MD.   All medical record entries made by the scribe were personally dictated by me I have reviewed the chart and agree the record accurately reflects my personal performance of his history physical examination and management

## 2025-02-17 ENCOUNTER — OFFICE VISIT (OUTPATIENT)
Dept: CARDIOLOGY | Facility: CLINIC | Age: 65
End: 2025-02-17
Payer: COMMERCIAL

## 2025-02-17 ENCOUNTER — HOSPITAL ENCOUNTER (OUTPATIENT)
Dept: RADIOLOGY | Facility: CLINIC | Age: 65
Discharge: HOME | End: 2025-02-17
Payer: COMMERCIAL

## 2025-02-17 VITALS
HEART RATE: 62 BPM | HEIGHT: 66 IN | DIASTOLIC BLOOD PRESSURE: 90 MMHG | WEIGHT: 150.6 LBS | OXYGEN SATURATION: 95 % | SYSTOLIC BLOOD PRESSURE: 163 MMHG | BODY MASS INDEX: 24.2 KG/M2

## 2025-02-17 DIAGNOSIS — D33.3 LEFT ACOUSTIC NEUROMA (MULTI): ICD-10-CM

## 2025-02-17 DIAGNOSIS — I51.7 ENLARGED HEART: ICD-10-CM

## 2025-02-17 DIAGNOSIS — I10 PRIMARY HYPERTENSION: Primary | ICD-10-CM

## 2025-02-17 PROCEDURE — 3008F BODY MASS INDEX DOCD: CPT | Performed by: INTERNAL MEDICINE

## 2025-02-17 PROCEDURE — 99214 OFFICE O/P EST MOD 30 MIN: CPT | Mod: 25 | Performed by: INTERNAL MEDICINE

## 2025-02-17 PROCEDURE — 3077F SYST BP >= 140 MM HG: CPT | Performed by: INTERNAL MEDICINE

## 2025-02-17 PROCEDURE — 99204 OFFICE O/P NEW MOD 45 MIN: CPT | Performed by: INTERNAL MEDICINE

## 2025-02-17 PROCEDURE — 70553 MRI BRAIN STEM W/O & W/DYE: CPT | Performed by: RADIOLOGY

## 2025-02-17 PROCEDURE — 70553 MRI BRAIN STEM W/O & W/DYE: CPT

## 2025-02-17 PROCEDURE — 2550000001 HC RX 255 CONTRASTS: Performed by: OTOLARYNGOLOGY

## 2025-02-17 PROCEDURE — 3080F DIAST BP >= 90 MM HG: CPT | Performed by: INTERNAL MEDICINE

## 2025-02-17 PROCEDURE — A9575 INJ GADOTERATE MEGLUMI 0.1ML: HCPCS | Performed by: OTOLARYNGOLOGY

## 2025-02-17 RX ORDER — LOSARTAN POTASSIUM 100 MG/1
100 TABLET ORAL DAILY
Qty: 90 TABLET | Refills: 3 | Status: SHIPPED | OUTPATIENT
Start: 2025-02-17 | End: 2026-02-17

## 2025-02-17 RX ORDER — GADOTERATE MEGLUMINE 376.9 MG/ML
14 INJECTION INTRAVENOUS
Status: COMPLETED | OUTPATIENT
Start: 2025-02-17 | End: 2025-02-17

## 2025-02-17 RX ADMIN — GADOTERATE MEGLUMINE 14 ML: 376.9 INJECTION INTRAVENOUS at 15:48

## 2025-02-17 ASSESSMENT — COLUMBIA-SUICIDE SEVERITY RATING SCALE - C-SSRS
1. IN THE PAST MONTH, HAVE YOU WISHED YOU WERE DEAD OR WISHED YOU COULD GO TO SLEEP AND NOT WAKE UP?: NO
6. HAVE YOU EVER DONE ANYTHING, STARTED TO DO ANYTHING, OR PREPARED TO DO ANYTHING TO END YOUR LIFE?: NO
2. HAVE YOU ACTUALLY HAD ANY THOUGHTS OF KILLING YOURSELF?: NO

## 2025-02-17 ASSESSMENT — PAIN SCALES - GENERAL: PAINLEVEL_OUTOF10: 0-NO PAIN

## 2025-02-17 ASSESSMENT — ENCOUNTER SYMPTOMS
OCCASIONAL FEELINGS OF UNSTEADINESS: 0
LOSS OF SENSATION IN FEET: 0
DEPRESSION: 0

## 2025-02-17 NOTE — PATIENT INSTRUCTIONS
Start Losartan 100mg once daily.  A new prescription has been sent to your pharmacy.   Schedule Coronary Calcium Score test.  Echocardiogram and follow up visit with Dr. Oconnell in 3 months.

## 2025-02-18 NOTE — PROGRESS NOTES
"Primary Care Physician: Phoenix Guerrero MD  Date of Visit: 02/17/2025  4:15 PM EST  [place] [facility] [address]     Chief Complaint:   Chief Complaint   Patient presents with    New Patient Visit     HPI / Summary:   Adrián Madison is a 64 y.o. male presents for new patient    ROS    Medical History:   He has a past medical history of Encounter for general adult medical examination without abnormal findings (02/25/2015), High blood pressure, Left acoustic neuroma (Multi) (10/08/2024), and Rheumatoid arthritis.  Surgical Hx:   He has no past surgical history on file.   Social Hx:   He reports that he has never smoked. He has never used smokeless tobacco. He reports current alcohol use. He reports that he does not use drugs.  Family Hx:   His family history includes Diabetes in his mother; Heart disease in his father.   Allergies:  Allergies   Allergen Reactions    Bee Venom Protein (Honey Bee) Anaphylaxis     Yellow jackets and bees per patient     Outpatient Medications:  Current Outpatient Medications   Medication Instructions    EPINEPHrine 0.3 mg/0.3 mL injection syringe INJECT 0.3 ML INTRAMUSCULARLY AS NEEDED.    losartan (COZAAR) 100 mg, oral, Daily    metoprolol succinate XL (TOPROL-XL) 25 mg, oral, Daily, Do not crush or chew.    nabumetone (RELAFEN) 750 mg, oral, 2 times daily    omeprazole (PRILOSEC) 40 mg, oral, Daily before breakfast, Do not crush or chew.     Physical Exam:  Vitals:    02/17/25 1635 02/17/25 1639   BP: (!) 163/92 163/90   BP Location: Right arm Left arm   Patient Position: Sitting Sitting   Pulse: 62    SpO2: 95%    Weight: 68.3 kg (150 lb 9.6 oz)    Height: 1.676 m (5' 6\")      Wt Readings from Last 5 Encounters:   02/17/25 68.3 kg (150 lb 9.6 oz)   02/14/25 68.9 kg (151 lb 12.8 oz)   02/05/25 68 kg (149 lb 14.4 oz)   02/17/25 68.5 kg (151 lb)   10/08/24 63.3 kg (139 lb 9.6 oz)     Physical Exam  JVP not elevated. Carotid impulses are 2+ without overlying bruit.   Chest " exhibits fair to good air movement with completely clear breath sounds.   The cardiac rhythm is regular with no premature beats.   Normal S1 and S2. No gallop, murmur or rub, or click.   Abdomen is soft and benign without focal tenderness.   With no lower leg edema. The pedal pulses are intact.     Last Labs:  Office Visit on 02/05/2025   Component Date Value    WHITE BLOOD CELL COUNT 02/06/2025 5.8     RED BLOOD CELL COUNT 02/06/2025 5.45     HEMOGLOBIN 02/06/2025 15.6     HEMATOCRIT 02/06/2025 47.7     MCV 02/06/2025 87.5     MCH 02/06/2025 28.6     MCHC 02/06/2025 32.7     RDW 02/06/2025 13.0     PLATELET COUNT 02/06/2025 213     MPV 02/06/2025 10.6     ABSOLUTE NEUTROPHILS 02/06/2025 3,004     ABSOLUTE LYMPHOCYTES 02/06/2025 2,134     ABSOLUTE MONOCYTES 02/06/2025 580     ABSOLUTE EOSINOPHILS 02/06/2025 52     ABSOLUTE BASOPHILS 02/06/2025 29     NEUTROPHILS 02/06/2025 51.8     LYMPHOCYTES 02/06/2025 36.8     MONOCYTES 02/06/2025 10.0     EOSINOPHILS 02/06/2025 0.9     BASOPHILS 02/06/2025 0.5     GLUCOSE 02/06/2025 88     UREA NITROGEN (BUN) 02/06/2025 18     CREATININE 02/06/2025 0.58 (L)     EGFR 02/06/2025 109     SODIUM 02/06/2025 138     POTASSIUM 02/06/2025 4.3     CHLORIDE 02/06/2025 102     CARBON DIOXIDE 02/06/2025 30     ELECTROLYTE BALANCE 02/06/2025 6 (L)     CALCIUM 02/06/2025 9.7     PROTEIN, TOTAL 02/06/2025 7.2     ALBUMIN 02/06/2025 4.5     BILIRUBIN, TOTAL 02/06/2025 0.8     ALKALINE PHOSPHATASE 02/06/2025 55     AST 02/06/2025 19     ALT 02/06/2025 18         Assessment/Plan   1.  Hypertension.  The patient is a upper middle-aged white male with previously detected hypertension.  The patient was seen by his rheumatologist and was detected as having an elevated reading at that time.  He was subsequently seen by his primary care physician who also detected an elevated reading and started losartan 50 mg daily Toprol-XL 25 mg daily.  EKG at that time showed sinus rhythm and voltage criteria for  LVH.  Systolic value remains elevated will increase losartan to 100 mg daily.  He will return in 3 months and an echocardiogram will be done to assess for possible hypertensive heart disease.  2.  Nondiabetic.  3.  Hyperlipidemia.  Patient's baseline lipid panel from 2021 includes a cholesterol 237  HDL 61.  The patient will be scheduled for CT coronary calcium score and based on these results a decision will be made as to whether or not to institute statin therapy.  4.?  Coronary artery disease.  Patient has a family history equivocal for disease father  at 87 of CHF.  He will have a CT coronary calcium score to risk stratify and to determine potential need for statin therapy.  5.  GERD.  Continue omeprazole.  6.  Former smoking.  The patient had been a 1 pack/day smoker but discontinued in .  7.  Status post open reduction internal fixation of right hand fracture        Orders:  Orders Placed This Encounter   Procedures    CT cardiac scoring wo IV contrast      Followup Appts:  Future Appointments   Date Time Provider Department Center   2025 10:00 AM Leyda Ruiz MD [id number] [facility]   2025  9:00 AM [facility] [id number] [facility] [place]   2025  3:30 PM MENT ECHO/STRESS [id number] [facility] [place]   2025  4:30 PM Sunny Oconnell MD [id number] [facility]           ____________________________________________________________  Sunny Oconnell MD  [facility] [place]  Assistant Clinical Professor, [employer]  [facility]

## 2025-02-20 ENCOUNTER — TELEPHONE (OUTPATIENT)
Facility: CLINIC | Age: 65
End: 2025-02-20
Payer: COMMERCIAL

## 2025-02-20 DIAGNOSIS — D33.3 LEFT ACOUSTIC NEUROMA (MULTI): ICD-10-CM

## 2025-02-20 NOTE — TELEPHONE ENCOUNTER
Spoke with patient regarding MRI results. Patient aware and will follow up with scheduling in 1 year with hearing test and new MRI. He will call the office any issues arise before appointment.     ----- Message from Leyda Ruiz sent at 2/19/2025  7:52 AM EST -----  Regarding: FW:  MRI is stable repeat his MRI in a year with audio too  ----- Message -----  From: Interface, Radiology Results In  Sent: 2/19/2025   7:25 AM EST  To: Leyda Ruiz MD

## 2025-02-21 ENCOUNTER — HOSPITAL ENCOUNTER (OUTPATIENT)
Dept: CARDIOLOGY | Facility: CLINIC | Age: 65
Discharge: HOME | End: 2025-02-21
Payer: COMMERCIAL

## 2025-02-21 DIAGNOSIS — I51.7 ENLARGED HEART: ICD-10-CM

## 2025-02-21 LAB
AORTIC VALVE PEAK VELOCITY: 1.01 M/S
AV PEAK GRADIENT: 4 MMHG
AVA (PEAK VEL): 3.02 CM2
EJECTION FRACTION APICAL 4 CHAMBER: 55.5
EJECTION FRACTION: 63 %
LEFT ATRIUM VOLUME AREA LENGTH INDEX BSA: 28.4 ML/M2
LEFT VENTRICLE INTERNAL DIMENSION DIASTOLE: 4.48 CM (ref 3.5–6)
LEFT VENTRICULAR OUTFLOW TRACT DIAMETER: 2.05 CM
MITRAL VALVE E/A RATIO: 0.8
RIGHT VENTRICLE FREE WALL PEAK S': 8.83 CM/S
RIGHT VENTRICLE PEAK SYSTOLIC PRESSURE: 30.3 MMHG
TRICUSPID ANNULAR PLANE SYSTOLIC EXCURSION: 2.6 CM

## 2025-02-21 PROCEDURE — 93306 TTE W/DOPPLER COMPLETE: CPT

## 2025-02-21 PROCEDURE — 93306 TTE W/DOPPLER COMPLETE: CPT | Performed by: INTERNAL MEDICINE

## 2025-03-18 DIAGNOSIS — K21.9 GASTROESOPHAGEAL REFLUX DISEASE, UNSPECIFIED WHETHER ESOPHAGITIS PRESENT: ICD-10-CM

## 2025-03-18 DIAGNOSIS — I10 PRIMARY HYPERTENSION: ICD-10-CM

## 2025-03-18 RX ORDER — OMEPRAZOLE 40 MG/1
40 CAPSULE, DELAYED RELEASE ORAL
Qty: 90 CAPSULE | Refills: 0 | Status: SHIPPED | OUTPATIENT
Start: 2025-03-18 | End: 2025-03-19 | Stop reason: SDUPTHER

## 2025-03-18 RX ORDER — METOPROLOL SUCCINATE 25 MG/1
25 TABLET, EXTENDED RELEASE ORAL DAILY
Qty: 90 TABLET | Refills: 0 | Status: SHIPPED | OUTPATIENT
Start: 2025-03-18 | End: 2025-03-19 | Stop reason: SDUPTHER

## 2025-03-19 DIAGNOSIS — I10 PRIMARY HYPERTENSION: ICD-10-CM

## 2025-03-19 DIAGNOSIS — K21.9 GASTROESOPHAGEAL REFLUX DISEASE, UNSPECIFIED WHETHER ESOPHAGITIS PRESENT: ICD-10-CM

## 2025-03-19 RX ORDER — OMEPRAZOLE 40 MG/1
40 CAPSULE, DELAYED RELEASE ORAL
Qty: 30 CAPSULE | Refills: 0 | Status: SHIPPED | OUTPATIENT
Start: 2025-03-19

## 2025-03-19 RX ORDER — METOPROLOL SUCCINATE 25 MG/1
25 TABLET, EXTENDED RELEASE ORAL DAILY
Qty: 30 TABLET | Refills: 0 | Status: SHIPPED | OUTPATIENT
Start: 2025-03-19

## 2025-04-08 ENCOUNTER — APPOINTMENT (OUTPATIENT)
Dept: OTOLARYNGOLOGY | Facility: CLINIC | Age: 65
End: 2025-04-08
Payer: COMMERCIAL

## 2025-04-29 ENCOUNTER — HOSPITAL ENCOUNTER (OUTPATIENT)
Dept: RADIOLOGY | Facility: CLINIC | Age: 65
Discharge: HOME | End: 2025-04-29
Payer: COMMERCIAL

## 2025-04-29 DIAGNOSIS — I10 PRIMARY HYPERTENSION: ICD-10-CM

## 2025-04-29 PROCEDURE — 75571 CT HRT W/O DYE W/CA TEST: CPT

## 2025-05-30 ENCOUNTER — DOCUMENTATION (OUTPATIENT)
Dept: PHYSICAL THERAPY | Facility: CLINIC | Age: 65
End: 2025-05-30
Payer: COMMERCIAL

## 2025-05-30 DIAGNOSIS — D33.3 LEFT ACOUSTIC NEUROMA (MULTI): Primary | ICD-10-CM

## 2025-05-30 NOTE — PROGRESS NOTES
Physical Therapy    Discharge Summary    Name: Adrián Madison  MRN: [id number]  Date: 5/30/2025    Discharge Information:   Date of discharge 5/30/2025  Date of last attended visit 12/11/24  Date of evaluation 11/8/24  Number of attended visits 2  Referred for   1. Left acoustic neuroma (Multi)            Therapy Summary: Pt was attempting low sodium diet at time of last session, advised tracking intake and symptoms to determine association. Pt did not return      Discharge Reason(s): Did not schedule additional appointments    Goals: Status unknown  Active       PT Problem       Patient Stated Goal       Start:  11/08/24    Expected End:  03/06/25       Improve dizziness.  Be able to complete tasks and not miss work.         LTG       Start:  12/12/24    Expected End:  03/06/25       Pt will be able to complete work shifts without symptom exacerbation.

## 2025-06-01 NOTE — PROGRESS NOTES
"Chief Complaint:   Follow-up (Here for follow up visit and echo results.   He denies any chest discomfort, dyspnea or dizziness.  )    History Of Present Illness:    .Mr Madison returns in follow up.  Denies chest pain, sob, palpitations or pedal edema.           Last Recorded Vitals:  Blood pressure 108/58, pulse 73, height 1.676 m (5' 6\"), weight 65.6 kg (144 lb 9.6 oz), SpO2 95%.     Past Medical History:  Medical History[1]     Past Surgical History:  Surgical History[2]    Social History:  Social History[3]    Family History:  Family History[4]      Allergies:  Bee venom protein (honey bee)    Outpatient Medications:  Current Medications[5]     Physical Exam:  Cardiovascular:      PMI at left midclavicular line. Normal rate. Regular rhythm. Normal S1. Normal S2.       Murmurs: There is no murmur.      No gallop.  No click. No rub.   Pulses:     Intact distal pulses.   Edema:     Peripheral edema absent.         ROS:  Review of Systems   Constitutional: Negative.   Cardiovascular: Negative.    Respiratory: Negative.     Skin: Negative.    Musculoskeletal: Negative.    Gastrointestinal: Negative.    Genitourinary: Negative.    Neurological: Negative.           Last Labs: reviewed  CBC -  Lab Results   Component Value Date    WBC 5.8 02/06/2025    HGB 15.6 02/06/2025    HCT 47.7 02/06/2025    MCV 87.5 02/06/2025     02/06/2025       CMP -  Lab Results   Component Value Date    CALCIUM 9.7 02/06/2025    PROT 7.2 02/06/2025    ALBUMIN 4.5 02/06/2025    AST 19 02/06/2025    ALT 18 02/06/2025    ALKPHOS 55 02/06/2025    BILITOT 0.8 02/06/2025       LIPID PANEL -   Lab Results   Component Value Date    CHOL 237 (H) 04/05/2021    TRIG 90 04/05/2021    HDL 61 04/05/2021    CHHDL 3.9 04/05/2021       RENAL FUNCTION PANEL -   Lab Results   Component Value Date    GLUCOSE 88 02/06/2025     02/06/2025    K 4.3 02/06/2025     02/06/2025    CO2 30 02/06/2025    ANIONGAP 6 (L) 02/06/2025    BUN 18 02/06/2025 " "   CREATININE 0.58 (L) 2025    CALCIUM 9.7 2025    ALBUMIN 4.5 2025        No results found for: \"BNP\", \"HGBA1C\"      Assessment/Plan   Problem List Items Addressed This Visit    None      1.  Hypertension.  The patient is a upper middle-aged white male with previously detected hypertension.  The patient was seen by his rheumatologist and was detected as having an elevated reading at that time.  He was subsequently seen by his primary care physician who also detected an elevated reading and started losartan 50 mg daily Toprol-XL 25 mg daily.  EKG at that time showed sinus rhythm and voltage criteria for LVH.  Systolic value remains elevated will increase losartan to 100 mg daily.   Echo 2025  CONCLUSIONS:   1. The left ventricular systolic function is normal, with a visually estimated ejection fraction of 60-65%.   2. Spectral Doppler shows an abnormal pattern of left ventricular diastolic filling.   3. There is mild to moderate focal basal septal hypertrophy.   4. Mildly dilated aortic root.   2.  Nondiabetic.  3.  Hyperlipidemia.  Patient's baseline lipid panel from 2021 includes a cholesterol 237  HDL 61.  The patient will be started on rosuvastatin 20 mg daily.  4.  Coronary artery disease.  Patient has a family history equivocal for disease father  at 87 of CHF.  He will have a CT coronary calcium score which was 137.89 when done 2025.  5.  GERD.  Continue omeprazole.  6.  Former smoking.  The patient had been a 1 pack/day smoker but discontinued in .  7.  Status post open reduction internal fixation of right hand fracture          Jenny Sneed, APRN-CNP       [1]   Past Medical History:  Diagnosis Date    Encounter for general adult medical examination without abnormal findings 2015    Physical exam, routine    High blood pressure     Left acoustic neuroma (Multi) 10/08/2024    Rheumatoid arthritis    [2] History reviewed. No pertinent surgical history.  [3] "   Social History  Socioeconomic History    Marital status:     Number of children: 2   Occupational History    Occupation: .   Tobacco Use    Smoking status: Never    Smokeless tobacco: Never   Substance and Sexual Activity    Alcohol use: Yes     Comment: socially    Drug use: Never   [4]   Family History  Problem Relation Name Age of Onset    Diabetes Mother      Heart disease Father     [5]   Current Outpatient Medications   Medication Sig Dispense Refill    EPINEPHrine 0.3 mg/0.3 mL injection syringe INJECT 0.3 ML INTRAMUSCULARLY AS NEEDED.      losartan (Cozaar) 100 mg tablet Take 1 tablet (100 mg) by mouth once daily. 90 tablet 3    metoprolol succinate XL (Toprol-XL) 25 mg 24 hr tablet Take 1 tablet (25 mg) by mouth once daily. Do not crush or chew. 30 tablet 0    nabumetone (Relafen) 750 mg tablet Take 1 tablet (750 mg) by mouth 2 times a day. 60 tablet 3    omeprazole (PriLOSEC) 40 mg DR capsule Take 1 capsule (40 mg) by mouth once daily in the morning. Take before meals. Do not crush or chew. 30 capsule 0     No current facility-administered medications for this visit.

## 2025-06-02 ENCOUNTER — APPOINTMENT (OUTPATIENT)
Dept: CARDIOLOGY | Facility: CLINIC | Age: 65
End: 2025-06-02
Payer: COMMERCIAL

## 2025-06-02 ENCOUNTER — OFFICE VISIT (OUTPATIENT)
Dept: CARDIOLOGY | Facility: CLINIC | Age: 65
End: 2025-06-02
Payer: COMMERCIAL

## 2025-06-02 VITALS
DIASTOLIC BLOOD PRESSURE: 58 MMHG | OXYGEN SATURATION: 95 % | SYSTOLIC BLOOD PRESSURE: 108 MMHG | WEIGHT: 144.6 LBS | BODY MASS INDEX: 23.24 KG/M2 | HEIGHT: 66 IN | HEART RATE: 73 BPM

## 2025-06-02 DIAGNOSIS — I10 PRIMARY HYPERTENSION: ICD-10-CM

## 2025-06-02 DIAGNOSIS — E78.5 HYPERLIPIDEMIA, UNSPECIFIED HYPERLIPIDEMIA TYPE: Primary | ICD-10-CM

## 2025-06-02 PROCEDURE — 99214 OFFICE O/P EST MOD 30 MIN: CPT | Performed by: NURSE PRACTITIONER

## 2025-06-02 PROCEDURE — 1036F TOBACCO NON-USER: CPT | Performed by: NURSE PRACTITIONER

## 2025-06-02 PROCEDURE — 3078F DIAST BP <80 MM HG: CPT | Performed by: NURSE PRACTITIONER

## 2025-06-02 PROCEDURE — 3008F BODY MASS INDEX DOCD: CPT | Performed by: NURSE PRACTITIONER

## 2025-06-02 PROCEDURE — 3074F SYST BP LT 130 MM HG: CPT | Performed by: NURSE PRACTITIONER

## 2025-06-02 RX ORDER — LOSARTAN POTASSIUM 100 MG/1
100 TABLET ORAL DAILY
Qty: 90 TABLET | Refills: 3 | Status: SHIPPED | OUTPATIENT
Start: 2025-06-02 | End: 2026-06-02

## 2025-06-02 RX ORDER — ROSUVASTATIN CALCIUM 20 MG/1
20 TABLET, COATED ORAL DAILY
Qty: 90 TABLET | Refills: 3 | Status: SHIPPED | OUTPATIENT
Start: 2025-06-02 | End: 2026-06-02

## 2025-06-02 ASSESSMENT — COLUMBIA-SUICIDE SEVERITY RATING SCALE - C-SSRS
2. HAVE YOU ACTUALLY HAD ANY THOUGHTS OF KILLING YOURSELF?: NO
1. IN THE PAST MONTH, HAVE YOU WISHED YOU WERE DEAD OR WISHED YOU COULD GO TO SLEEP AND NOT WAKE UP?: NO
6. HAVE YOU EVER DONE ANYTHING, STARTED TO DO ANYTHING, OR PREPARED TO DO ANYTHING TO END YOUR LIFE?: NO

## 2025-06-02 ASSESSMENT — PAIN SCALES - GENERAL: PAINLEVEL_OUTOF10: 2

## 2025-06-02 ASSESSMENT — ENCOUNTER SYMPTOMS
CONSTITUTIONAL NEGATIVE: 1
CARDIOVASCULAR NEGATIVE: 1
MUSCULOSKELETAL NEGATIVE: 1
RESPIRATORY NEGATIVE: 1
NEUROLOGICAL NEGATIVE: 1
GASTROINTESTINAL NEGATIVE: 1

## 2025-06-02 ASSESSMENT — PATIENT HEALTH QUESTIONNAIRE - PHQ9
2. FEELING DOWN, DEPRESSED OR HOPELESS: NOT AT ALL
1. LITTLE INTEREST OR PLEASURE IN DOING THINGS: NOT AT ALL
SUM OF ALL RESPONSES TO PHQ9 QUESTIONS 1 AND 2: 0

## 2025-06-24 DIAGNOSIS — I10 PRIMARY HYPERTENSION: ICD-10-CM

## 2025-06-24 DIAGNOSIS — K21.9 GASTROESOPHAGEAL REFLUX DISEASE, UNSPECIFIED WHETHER ESOPHAGITIS PRESENT: ICD-10-CM

## 2025-06-24 RX ORDER — METOPROLOL SUCCINATE 25 MG/1
25 TABLET, EXTENDED RELEASE ORAL DAILY
Qty: 90 TABLET | Refills: 0 | Status: SHIPPED | OUTPATIENT
Start: 2025-06-24

## 2025-06-24 RX ORDER — OMEPRAZOLE 40 MG/1
40 CAPSULE, DELAYED RELEASE ORAL
Qty: 90 CAPSULE | Refills: 0 | Status: SHIPPED | OUTPATIENT
Start: 2025-06-24

## 2025-09-01 DIAGNOSIS — K21.9 GASTROESOPHAGEAL REFLUX DISEASE, UNSPECIFIED WHETHER ESOPHAGITIS PRESENT: ICD-10-CM

## 2025-09-01 DIAGNOSIS — I10 PRIMARY HYPERTENSION: ICD-10-CM

## 2025-09-02 RX ORDER — OMEPRAZOLE 40 MG/1
40 CAPSULE, DELAYED RELEASE ORAL
Qty: 90 CAPSULE | Refills: 0 | Status: SHIPPED | OUTPATIENT
Start: 2025-09-02

## 2025-09-02 RX ORDER — METOPROLOL SUCCINATE 25 MG/1
25 TABLET, EXTENDED RELEASE ORAL DAILY
Qty: 90 TABLET | Refills: 0 | Status: SHIPPED | OUTPATIENT
Start: 2025-09-02